# Patient Record
Sex: FEMALE | Race: WHITE | NOT HISPANIC OR LATINO | URBAN - NONMETROPOLITAN AREA
[De-identification: names, ages, dates, MRNs, and addresses within clinical notes are randomized per-mention and may not be internally consistent; named-entity substitution may affect disease eponyms.]

---

## 2019-06-26 ENCOUNTER — IMPORTED ENCOUNTER (OUTPATIENT)
Dept: URBAN - NONMETROPOLITAN AREA CLINIC 1 | Facility: CLINIC | Age: 49
End: 2019-06-26

## 2019-06-26 PROBLEM — H52.4: Noted: 2017-05-05

## 2019-06-26 PROBLEM — H52.13: Noted: 2017-05-05

## 2019-06-26 PROBLEM — H02.831: Noted: 2019-06-26

## 2019-06-26 PROBLEM — H02.834: Noted: 2019-06-26

## 2019-06-26 PROCEDURE — 92310 CONTACT LENS FITTING OU: CPT

## 2019-06-26 PROCEDURE — 92014 COMPRE OPH EXAM EST PT 1/>: CPT

## 2019-06-26 PROCEDURE — 92015 DETERMINE REFRACTIVE STATE: CPT

## 2019-06-26 NOTE — PATIENT DISCUSSION
Simple Myopia OU -  discussed fidninsg w/patient-  new spectacle Rx issued-  CL trials ordered today-  RTC for dispense appointment when trials arrive-  monitor yearly or prn Dermatochalasis OU -  discussed findings w/patient-  patient complains of decreased vision d/t drooping lids-  discussed referral to [facility] for Eval patient agrees with plan-  refer to [facility] for Ptosis Eval; 's Notes: MR 6/26/2019DFE 6/26/2019

## 2019-07-16 ENCOUNTER — IMPORTED ENCOUNTER (OUTPATIENT)
Dept: URBAN - NONMETROPOLITAN AREA CLINIC 1 | Facility: CLINIC | Age: 49
End: 2019-07-16

## 2019-07-16 NOTE — PATIENT DISCUSSION
CL dispense-  discussed findings w/patient-  patient is happy with lenses today-  will dispense Rx and trials today-  patient to call and let us know if she needs to have the lenses re-evaluated-  continue to monitor yearly or prn; 's Notes: MR 6/26/2019DFE 6/26/2019

## 2019-12-13 ENCOUNTER — IMPORTED ENCOUNTER (OUTPATIENT)
Dept: URBAN - NONMETROPOLITAN AREA CLINIC 1 | Facility: CLINIC | Age: 49
End: 2019-12-13

## 2019-12-13 PROBLEM — H02.831: Noted: 2019-12-13

## 2019-12-13 PROBLEM — H52.13: Noted: 2019-12-13

## 2019-12-13 PROBLEM — H52.4: Noted: 2019-12-13

## 2019-12-13 PROBLEM — H02.834: Noted: 2019-12-13

## 2019-12-13 PROCEDURE — 92012 INTRM OPH EXAM EST PATIENT: CPT

## 2019-12-13 NOTE — PATIENT DISCUSSION
*Ptosis:.-Ptosis  (the upper eyelid being in a lower than normal position) of the upper eyelid  was explained to the patient.-This can result in loss of superior visual field.-Treatment options include observation or surgical correction.-Order ptosis visual field and photos. -Risk and benefits of the surgery were discussed with the patient. Directed to use AT's MRD1OS  1OD 1BLF 14(-) lag TBUT 12schirmers OD 16 OS 15possible 4.0 silk OD; 's Notes: MR 6/26/2019DFE 6/26/2019

## 2020-01-02 ENCOUNTER — IMPORTED ENCOUNTER (OUTPATIENT)
Dept: URBAN - NONMETROPOLITAN AREA CLINIC 1 | Facility: CLINIC | Age: 50
End: 2020-01-02

## 2020-01-02 PROCEDURE — 92083 EXTENDED VISUAL FIELD XM: CPT

## 2020-01-02 NOTE — PATIENT DISCUSSION
*Ptosis:.-Ptosis  (the upper eyelid being in a lower than normal position) of the upper eyelid  was explained to the patient.-This can result in loss of superior visual field.-Treatment options include observation or surgical correction.-Order ptosis visual field and photos. -Risk and benefits of the surgery were discussed with the patient. Directed to use AT's MRD1OS  1OD 1BLF 14(-) lag TBUT 12schirmers OD 16 OS 15possible 4.0 silk OS; 's Notes: MR 6/26/2019DFE 6/26/2019

## 2020-03-02 ENCOUNTER — IMPORTED ENCOUNTER (OUTPATIENT)
Dept: URBAN - NONMETROPOLITAN AREA CLINIC 1 | Facility: CLINIC | Age: 50
End: 2020-03-02

## 2020-03-02 PROCEDURE — 67904 REPAIR EYELID DEFECT: CPT

## 2020-03-02 NOTE — PATIENT DISCUSSION
*Ptosis:.-Ptosis  (the upper eyelid being in a lower than normal position) of the upper eyelid  was explained to the patient.-This can result in loss of superior visual field.-Treatment options include observation or surgical correction.-Order ptosis visual field and photos. -Risk and benefits of the surgery were discussed with the patient.-Pt elects to have procedure today.; 's Notes: MR 6/26/2019DFE 6/26/2019

## 2020-03-05 PROBLEM — H02.413: Noted: 2020-03-05

## 2020-03-05 PROBLEM — H02.834: Noted: 2020-03-05

## 2020-03-05 PROBLEM — H02.831: Noted: 2020-03-05

## 2020-03-13 ENCOUNTER — IMPORTED ENCOUNTER (OUTPATIENT)
Dept: URBAN - NONMETROPOLITAN AREA CLINIC 1 | Facility: CLINIC | Age: 50
End: 2020-03-13

## 2020-03-13 NOTE — PATIENT DISCUSSION
s/p Ptosis Repair OU w/Suture Removal-  discussed findings w/patient-  healing nicely and doing well.  -  sutures removed at SL well tolerated-  patient cleared to d/c jose-  continue cool compresses-  patient is cleared to continue daily activities and resume CL wear -  RTC 1 month f/u Ptosis Repair or prn; 's Notes: MR 6/26/2019DFE 6/26/2019

## 2020-04-17 ENCOUNTER — IMPORTED ENCOUNTER (OUTPATIENT)
Dept: URBAN - NONMETROPOLITAN AREA CLINIC 1 | Facility: CLINIC | Age: 50
End: 2020-04-17

## 2020-04-17 PROCEDURE — 99024 POSTOP FOLLOW-UP VISIT: CPT

## 2020-04-17 NOTE — PATIENT DISCUSSION
s/p Ptosis Repair OU w/Suture Removal-  discussed findings w/patient-  noticeable edema RUL patient is very concerned-  she has been using cool compresses but says that this is not helping much-  patient requests appt w/JS to f/u-  next available f/u w/JS; 's Notes: MR 6/26/2019DFE 6/26/2019

## 2020-07-01 ENCOUNTER — IMPORTED ENCOUNTER (OUTPATIENT)
Dept: URBAN - NONMETROPOLITAN AREA CLINIC 1 | Facility: CLINIC | Age: 50
End: 2020-07-01

## 2020-07-01 PROBLEM — H52.13: Noted: 2020-07-01

## 2020-07-01 PROBLEM — H02.834: Noted: 2020-07-01

## 2020-07-01 PROBLEM — H02.831: Noted: 2020-07-01

## 2020-07-01 PROBLEM — H02.413: Noted: 2020-07-01

## 2020-07-01 PROBLEM — H52.4: Noted: 2020-07-01

## 2020-07-01 PROCEDURE — 92014 COMPRE OPH EXAM EST PT 1/>: CPT

## 2020-07-01 PROCEDURE — 92310 CONTACT LENS FITTING OU: CPT

## 2020-07-01 PROCEDURE — 92015 DETERMINE REFRACTIVE STATE: CPT

## 2020-07-01 NOTE — PATIENT DISCUSSION
Simple Myopia OU w/Presbyopia-  discussed findings w/patient-  new spectacle/CL Rx issued stable -  monitor yearly or prn s/p Ptosis Repair OU w/Suture Removal-  discussed findings w/patient-  patient continues to feel that OD is not doing as well as she expected post-op-  she requests to have an appt w/JS for f/u eval -  continue to monitor as per [address] [place] [address]; 's Notes: MR 7/1/2020DFE 7/1/2020

## 2020-07-31 ENCOUNTER — IMPORTED ENCOUNTER (OUTPATIENT)
Dept: URBAN - NONMETROPOLITAN AREA CLINIC 1 | Facility: CLINIC | Age: 50
End: 2020-07-31

## 2020-07-31 PROBLEM — Z98.890: Noted: 2020-07-31

## 2020-07-31 PROCEDURE — 92012 INTRM OPH EXAM EST PATIENT: CPT

## 2020-07-31 NOTE — PATIENT DISCUSSION
s/p Bilateral Ptosis Repair OU -Pt is doing well and looks good on todays exam -I do not recommend do any more surgery or stretching at any lids and pulling any tighter and explained this to patient. She agrees. -Pt sutures were removed 2 week s/p successfully. -Advised patient to not have any botox to the foreheadeducated her on the places to have botox. Risk of having the botox running down into the area of her eyelids and droop her brows and lids. Patient understands. We can do Botox for patient if she desires.  -RTC PRN; 's Notes: MR 7/1/2020DFE 7/1/2020

## 2021-07-23 ENCOUNTER — IMPORTED ENCOUNTER (OUTPATIENT)
Dept: URBAN - NONMETROPOLITAN AREA CLINIC 1 | Facility: CLINIC | Age: 51
End: 2021-07-23

## 2021-07-23 PROBLEM — H52.13: Noted: 2021-07-23

## 2021-07-23 PROBLEM — H52.4: Noted: 2021-07-23

## 2021-07-23 PROCEDURE — 92015 DETERMINE REFRACTIVE STATE: CPT

## 2021-07-23 PROCEDURE — 92014 COMPRE OPH EXAM EST PT 1/>: CPT

## 2021-07-23 PROCEDURE — 92310 CONTACT LENS FITTING OU: CPT

## 2021-07-23 NOTE — PATIENT DISCUSSION
Simple Myopia OU w/Presbyopia-  discussed findings w/patient-  new spectacle/CL Rx issued-  RTC 1 year or prn; 's Notes: MR 7/23/2021DFE 7/23/2021

## 2022-04-10 ASSESSMENT — TONOMETRY
OS_IOP_MMHG: 14
OD_IOP_MMHG: 14
OS_IOP_MMHG: 14
OD_IOP_MMHG: 14

## 2022-04-10 ASSESSMENT — VISUAL ACUITY
OS_SC: 20/20-2
OD_SC: 20/20
OD_SC: J1
OU_SC: 20/20
OU_SC: J1+
OD_SC: 20/20
OS_CC: J1
OS_SC: 20/20
OD_SC: J3
OS_SC: 20/25-
OS_SC: 20/30
OS_SC: J1+
OS_CC: 20/20
OD_CC: 20/20-2
OU_CC: J1
OD_SC: J3
OD_CC: J1
OD_SC: 20/20
OU_SC: J1
OU_SC: J1+
OU_CC: 20/20
OS_SC: J1
OS_SC: J1+
OD_SC: 20/30
OU_CC: J1+
OS_SC: 20/20-2
OU_SC: 20/20
OD_SC: 20/20
OS_SC: 20/20
OS_SC: 20/20-1

## 2022-05-05 ENCOUNTER — EMERGENCY VISIT (OUTPATIENT)
Dept: URBAN - NONMETROPOLITAN AREA CLINIC 4 | Facility: CLINIC | Age: 52
End: 2022-05-05

## 2022-05-05 DIAGNOSIS — H16.141: ICD-10-CM

## 2022-05-05 PROCEDURE — 99213 OFFICE O/P EST LOW 20 MIN: CPT

## 2022-05-05 ASSESSMENT — VISUAL ACUITY
OD_CC: 20/20-2
OU_CC: 20/20
OS_CC: 20/20

## 2022-05-05 ASSESSMENT — TONOMETRY
OD_IOP_MMHG: 15
OS_IOP_MMHG: 15

## 2022-05-26 ENCOUNTER — FOLLOW UP (OUTPATIENT)
Dept: URBAN - NONMETROPOLITAN AREA CLINIC 4 | Facility: CLINIC | Age: 52
End: 2022-05-26

## 2022-05-26 DIAGNOSIS — H16.141: ICD-10-CM

## 2022-05-26 PROCEDURE — 99213 OFFICE O/P EST LOW 20 MIN: CPT

## 2022-05-26 ASSESSMENT — TONOMETRY
OS_IOP_MMHG: 15
OD_IOP_MMHG: 16

## 2022-05-26 ASSESSMENT — VISUAL ACUITY
OD_CC: 20/25
OU_CC: 20/20
OS_CC: 20/20

## 2022-05-26 NOTE — PATIENT DISCUSSION
Completely resolved at this time.  Patient has already d/c'ed Lotemax.  Continue Systane at least BID OU. Recommend Refresh Gel for QHS OU.

## 2022-10-31 ENCOUNTER — COMPREHENSIVE EXAM (OUTPATIENT)
Dept: RURAL CLINIC 1 | Facility: CLINIC | Age: 52
End: 2022-10-31

## 2022-10-31 DIAGNOSIS — H52.13: ICD-10-CM

## 2022-10-31 PROCEDURE — 92310-E CONTACT LENS FITTING ESTABLISH PATIENT

## 2022-10-31 PROCEDURE — 92014 COMPRE OPH EXAM EST PT 1/>: CPT

## 2022-10-31 PROCEDURE — 92015 DETERMINE REFRACTIVE STATE: CPT

## 2022-10-31 ASSESSMENT — VISUAL ACUITY
OS_CC: 20/20
OD_CC: 20/20
OU_CC: 20/20

## 2022-10-31 ASSESSMENT — TONOMETRY
OS_IOP_MMHG: 15
OD_IOP_MMHG: 15

## 2024-02-01 ENCOUNTER — COMPREHENSIVE EXAM (OUTPATIENT)
Dept: URBAN - NONMETROPOLITAN AREA CLINIC 4 | Facility: CLINIC | Age: 54
End: 2024-02-01

## 2024-02-01 ASSESSMENT — VISUAL ACUITY
OS_CC: 20/20
OD_CC: 20/20

## 2024-02-01 ASSESSMENT — TONOMETRY
OS_IOP_MMHG: 14
OD_IOP_MMHG: 14

## 2025-04-28 ENCOUNTER — COMPREHENSIVE EXAM (OUTPATIENT)
Age: 55
End: 2025-04-28

## 2025-04-28 DIAGNOSIS — H01.112: ICD-10-CM

## 2025-04-28 DIAGNOSIS — H00.022: ICD-10-CM

## 2025-04-28 DIAGNOSIS — H52.13: ICD-10-CM

## 2025-04-28 PROCEDURE — 92310-2 LEVEL 2 SOFT LENS UPDATE

## 2025-04-28 PROCEDURE — 92015 DETERMINE REFRACTIVE STATE: CPT

## 2025-04-28 PROCEDURE — 92014 COMPRE OPH EXAM EST PT 1/>: CPT

## 2025-08-08 ENCOUNTER — EMERGENCY VISIT (OUTPATIENT)
Age: 55
End: 2025-08-08

## 2025-08-08 DIAGNOSIS — H00.11: ICD-10-CM

## 2025-08-08 PROCEDURE — 99213 OFFICE O/P EST LOW 20 MIN: CPT
